# Patient Record
Sex: MALE | Race: WHITE | NOT HISPANIC OR LATINO | URBAN - METROPOLITAN AREA
[De-identification: names, ages, dates, MRNs, and addresses within clinical notes are randomized per-mention and may not be internally consistent; named-entity substitution may affect disease eponyms.]

---

## 2020-02-27 ENCOUNTER — APPOINTMENT (RX ONLY)
Dept: URBAN - METROPOLITAN AREA CLINIC 116 | Facility: CLINIC | Age: 70
Setting detail: DERMATOLOGY
End: 2020-02-27

## 2020-02-27 DIAGNOSIS — L57.0 ACTINIC KERATOSIS: ICD-10-CM

## 2020-02-27 DIAGNOSIS — D49.2 NEOPLASM OF UNSPECIFIED BEHAVIOR OF BONE, SOFT TISSUE, AND SKIN: ICD-10-CM

## 2020-02-27 DIAGNOSIS — D18.0 HEMANGIOMA: ICD-10-CM

## 2020-02-27 DIAGNOSIS — Z71.89 OTHER SPECIFIED COUNSELING: ICD-10-CM

## 2020-02-27 DIAGNOSIS — L82.1 OTHER SEBORRHEIC KERATOSIS: ICD-10-CM

## 2020-02-27 DIAGNOSIS — D22 MELANOCYTIC NEVI: ICD-10-CM

## 2020-02-27 DIAGNOSIS — L91.8 OTHER HYPERTROPHIC DISORDERS OF THE SKIN: ICD-10-CM

## 2020-02-27 DIAGNOSIS — L81.4 OTHER MELANIN HYPERPIGMENTATION: ICD-10-CM

## 2020-02-27 DIAGNOSIS — D485 NEOPLASM OF UNCERTAIN BEHAVIOR OF SKIN: ICD-10-CM

## 2020-02-27 PROBLEM — D48.5 NEOPLASM OF UNCERTAIN BEHAVIOR OF SKIN: Status: ACTIVE | Noted: 2020-02-27

## 2020-02-27 PROBLEM — D18.01 HEMANGIOMA OF SKIN AND SUBCUTANEOUS TISSUE: Status: ACTIVE | Noted: 2020-02-27

## 2020-02-27 PROBLEM — D22.5 MELANOCYTIC NEVI OF TRUNK: Status: ACTIVE | Noted: 2020-02-27

## 2020-02-27 PROCEDURE — 11103 TANGNTL BX SKIN EA SEP/ADDL: CPT

## 2020-02-27 PROCEDURE — 17003 DESTRUCT PREMALG LES 2-14: CPT

## 2020-02-27 PROCEDURE — ? COUNSELING

## 2020-02-27 PROCEDURE — ? BIOPSY BY SHAVE METHOD

## 2020-02-27 PROCEDURE — 99203 OFFICE O/P NEW LOW 30 MIN: CPT | Mod: 25

## 2020-02-27 PROCEDURE — 11102 TANGNTL BX SKIN SINGLE LES: CPT

## 2020-02-27 PROCEDURE — 17000 DESTRUCT PREMALG LESION: CPT | Mod: 59

## 2020-02-27 PROCEDURE — ? LIQUID NITROGEN

## 2020-02-27 ASSESSMENT — LOCATION SIMPLE DESCRIPTION DERM
LOCATION SIMPLE: RIGHT UPPER BACK
LOCATION SIMPLE: RIGHT AXILLARY VAULT
LOCATION SIMPLE: POSTERIOR NECK
LOCATION SIMPLE: CHEST
LOCATION SIMPLE: RIGHT CHEEK
LOCATION SIMPLE: LEFT CHEEK
LOCATION SIMPLE: LEFT FOREHEAD
LOCATION SIMPLE: ABDOMEN
LOCATION SIMPLE: LEFT PRETIBIAL REGION

## 2020-02-27 ASSESSMENT — LOCATION DETAILED DESCRIPTION DERM
LOCATION DETAILED: LEFT DISTAL PRETIBIAL REGION
LOCATION DETAILED: RIGHT SUPERIOR MEDIAL UPPER BACK
LOCATION DETAILED: RIGHT AXILLARY VAULT
LOCATION DETAILED: RIGHT MEDIAL TRAPEZIAL NECK
LOCATION DETAILED: EPIGASTRIC SKIN
LOCATION DETAILED: RIGHT SUPERIOR LATERAL MALAR CHEEK
LOCATION DETAILED: RIGHT INFERIOR LATERAL MALAR CHEEK
LOCATION DETAILED: RIGHT MID-UPPER BACK
LOCATION DETAILED: RIGHT MEDIAL SUPERIOR CHEST
LOCATION DETAILED: LEFT LATERAL FOREHEAD
LOCATION DETAILED: LEFT CENTRAL MALAR CHEEK

## 2020-02-27 ASSESSMENT — LOCATION ZONE DERM
LOCATION ZONE: AXILLAE
LOCATION ZONE: LEG
LOCATION ZONE: FACE
LOCATION ZONE: TRUNK
LOCATION ZONE: NECK

## 2020-02-27 NOTE — PROCEDURE: BIOPSY BY SHAVE METHOD
Body Location Override (Optional - Billing Will Still Be Based On Selected Body Map Location If Applicable): right left zygoma
Detail Level: Detailed
Depth Of Biopsy: dermis
Was A Bandage Applied: Yes
Size Of Lesion In Cm: 1.2
X Size Of Lesion In Cm: 0
Biopsy Type: H and E
Biopsy Method: Personna blade
Anesthesia Type: 1% lidocaine with epinephrine and a 1:10 solution of 8.4% sodium bicarbonate
Anesthesia Volume In Cc (Will Not Render If 0): 0.5
Hemostasis: Aluminum Chloride
Wound Care: Vaseline
Dressing: Band-Aid
Destruction After The Procedure: No
Type Of Destruction Used: Curettage
Curettage Text: The wound bed was treated with curettage after the biopsy was performed.
Cryotherapy Text: The wound bed was treated with cryotherapy after the biopsy was performed.
Electrodesiccation Text: The wound bed was treated with electrodesiccation after the biopsy was performed.
Electrodesiccation And Curettage Text: The wound bed was treated with electrodesiccation and curettage after the biopsy was performed.
Silver Nitrate Text: The wound bed was treated with silver nitrate after the biopsy was performed.
Lab: [facility]
Lab Facility: [facility]
Consent: The provider's intent is to obtain a tissue sample solely for diagnostic purposes. Written consent to obtain tissue sample was obtained and risks were reviewed including but not limited to scarring, infection, bleeding, scabbing, incomplete removal, nerve damage and allergy to anesthesia.
Post-Care Instructions: I reviewed with the patient in detail post-care instructions. Patient is to keep the biopsy site dry overnight, and then apply bacitracin twice daily until healed. Patient may apply hydrogen peroxide soaks to remove any crusting.
Notification Instructions: Patient will be notified of biopsy results. However, patient instructed to call the office if not contacted within 2 weeks.
Billing Type: [employer]
Body Location Override (Optional - Billing Will Still Be Based On Selected Body Map Location If Applicable): right medial chest
Size Of Lesion In Cm: 0.3
Lab: [id number]
Lab Facility: [id number]
Billing Type: Third-Party Bill
Body Location Override (Optional - Billing Will Still Be Based On Selected Body Map Location If Applicable): left pretibia
Size Of Lesion In Cm: 1.5

## 2020-02-27 NOTE — PROCEDURE: LIQUID NITROGEN
Post-Care Instructions: I reviewed with the patient in detail post-care instructions. Patient is to wear sunprotection, and avoid picking at any of the treated lesions. Pt may apply Vaseline to crusted or scabbing areas.
Detail Level: Simple
Render Post-Care Instructions In Note?: yes
Render Note In Bullet Format When Appropriate: No
Number Of Freeze-Thaw Cycles: 2 freeze-thaw cycles
Consent: The patient's consent was obtained including but not limited to risks of crusting, scabbing, blistering, scarring, darker or lighter pigmentary change, recurrence, incomplete removal and infection.
Duration Of Freeze Thaw-Cycle (Seconds): 2
oriented to person, place, time/situation/alert/awake

## 2020-06-24 ENCOUNTER — APPOINTMENT (RX ONLY)
Dept: URBAN - METROPOLITAN AREA CLINIC 86 | Facility: CLINIC | Age: 70
Setting detail: DERMATOLOGY
End: 2020-06-24

## 2020-11-02 ENCOUNTER — APPOINTMENT (RX ONLY)
Dept: URBAN - METROPOLITAN AREA CLINIC 116 | Facility: CLINIC | Age: 70
Setting detail: DERMATOLOGY
End: 2020-11-02

## 2020-12-08 ENCOUNTER — APPOINTMENT (RX ONLY)
Dept: URBAN - METROPOLITAN AREA CLINIC 116 | Facility: CLINIC | Age: 70
Setting detail: DERMATOLOGY
End: 2020-12-08

## 2020-12-08 VITALS — TEMPERATURE: 97.1 F

## 2020-12-08 DIAGNOSIS — D49.2 NEOPLASM OF UNSPECIFIED BEHAVIOR OF BONE, SOFT TISSUE, AND SKIN: ICD-10-CM

## 2020-12-08 DIAGNOSIS — L57.0 ACTINIC KERATOSIS: ICD-10-CM

## 2020-12-08 DIAGNOSIS — L82.1 OTHER SEBORRHEIC KERATOSIS: ICD-10-CM

## 2020-12-08 DIAGNOSIS — D18.0 HEMANGIOMA: ICD-10-CM

## 2020-12-08 DIAGNOSIS — L81.4 OTHER MELANIN HYPERPIGMENTATION: ICD-10-CM

## 2020-12-08 DIAGNOSIS — D22 MELANOCYTIC NEVI: ICD-10-CM

## 2020-12-08 PROBLEM — D18.01 HEMANGIOMA OF SKIN AND SUBCUTANEOUS TISSUE: Status: ACTIVE | Noted: 2020-12-08

## 2020-12-08 PROBLEM — D22.5 MELANOCYTIC NEVI OF TRUNK: Status: ACTIVE | Noted: 2020-12-08

## 2020-12-08 PROBLEM — C44.719 BASAL CELL CARCINOMA OF SKIN OF LEFT LOWER LIMB, INCLUDING HIP: Status: ACTIVE | Noted: 2020-12-08

## 2020-12-08 PROCEDURE — ? LIQUID NITROGEN

## 2020-12-08 PROCEDURE — 17000 DESTRUCT PREMALG LESION: CPT | Mod: 59

## 2020-12-08 PROCEDURE — ? COUNSELING

## 2020-12-08 PROCEDURE — 99214 OFFICE O/P EST MOD 30 MIN: CPT | Mod: 25

## 2020-12-08 PROCEDURE — ? SHAVE REMOVAL

## 2020-12-08 PROCEDURE — ? DIAGNOSIS COMMENT

## 2020-12-08 PROCEDURE — 17003 DESTRUCT PREMALG LES 2-14: CPT

## 2020-12-08 PROCEDURE — ? TREATMENT REGIMEN

## 2020-12-08 PROCEDURE — ? PRESCRIPTION

## 2020-12-08 PROCEDURE — 11301 SHAVE SKIN LESION 0.6-1.0 CM: CPT

## 2020-12-08 RX ORDER — MUPIROCIN 20 MG/G
1 OINTMENT TOPICAL BID
Qty: 1 | Refills: 2 | Status: ERX | COMMUNITY
Start: 2020-12-08

## 2020-12-08 RX ORDER — IMIQUIMOD 50 MG/G
1 CREAM TOPICAL QHS
Qty: 2 | Refills: 3 | Status: ERX | COMMUNITY
Start: 2020-12-08

## 2020-12-08 RX ADMIN — MUPIROCIN 1: 20 OINTMENT TOPICAL at 00:00

## 2020-12-08 RX ADMIN — IMIQUIMOD 1: 50 CREAM TOPICAL at 00:00

## 2020-12-08 ASSESSMENT — LOCATION SIMPLE DESCRIPTION DERM
LOCATION SIMPLE: LEFT EAR
LOCATION SIMPLE: RIGHT UPPER BACK
LOCATION SIMPLE: LEFT UPPER ARM
LOCATION SIMPLE: ABDOMEN
LOCATION SIMPLE: POSTERIOR NECK
LOCATION SIMPLE: LEFT CHEEK

## 2020-12-08 ASSESSMENT — LOCATION ZONE DERM
LOCATION ZONE: ARM
LOCATION ZONE: TRUNK
LOCATION ZONE: FACE
LOCATION ZONE: EAR
LOCATION ZONE: NECK

## 2020-12-08 ASSESSMENT — LOCATION DETAILED DESCRIPTION DERM
LOCATION DETAILED: RIGHT MEDIAL TRAPEZIAL NECK
LOCATION DETAILED: EPIGASTRIC SKIN
LOCATION DETAILED: RIGHT SUPERIOR MEDIAL UPPER BACK
LOCATION DETAILED: LEFT SUPERIOR HELIX
LOCATION DETAILED: LEFT ANTERIOR DISTAL UPPER ARM
LOCATION DETAILED: RIGHT MID-UPPER BACK
LOCATION DETAILED: LEFT CENTRAL MALAR CHEEK

## 2020-12-08 NOTE — PROCEDURE: LIQUID NITROGEN
Post-Care Instructions: I reviewed with the patient in detail post-care instructions. Patient is to wear sunprotection, and avoid picking at any of the treated lesions. Pt may apply Vaseline to crusted or scabbing areas.
Consent: The patient's consent was obtained including but not limited to risks of crusting, scabbing, blistering, scarring, darker or lighter pigmentary change, recurrence, incomplete removal and infection.
Detail Level: Detailed
Duration Of Freeze Thaw-Cycle (Seconds): 2
Number Of Freeze-Thaw Cycles: 2 freeze-thaw cycles
Render Post-Care Instructions In Note?: yes
Render Note In Bullet Format When Appropriate: No

## 2020-12-08 NOTE — PROCEDURE: SHAVE REMOVAL
Medical Necessity Information: It is in your best interest to select a reason for this procedure from the list below. All of these items fulfill various CMS LCD requirements except the new and changing color options.
Medical Necessity Clause: The lesion was removed in its entirety and was medically necessary because the lesion that was treated was:
Lab: [facility]
Lab Facility: [facility]
Body Location Override (Optional - Billing Will Still Be Based On Selected Body Map Location If Applicable): left upper arm
Detail Level: Detailed
Was A Bandage Applied: Yes
Size Of Lesion In Cm (Required): 0.6
X Size Of Lesion In Cm (Optional): 0
Biopsy Method: Dermablade
Anesthesia Type: 1% lidocaine with epinephrine and a 1:10 solution of 8.4% sodium bicarbonate
Anesthesia Volume In Cc: 1
Hemostasis: Electrocautery
Wound Care: Petrolatum
Path Notes (To The Dermatopathologist): Jolie Perez ...
Render Path Notes In Note?: No
Consent: The provider's intent is to therapeutically remove the lesion in its entirety while at the same time obtaining a tissue sample for histopathologic examination. The risks and benefits to therapy were discussed in detail. Specifically, the risks of infection, scarring, bleeding, prolonged wound healing, incomplete removal, allergy to anesthesia, nerve injury and recurrence were addressed. Prior to the procedure, the treatment site was clearly identified and confirmed by the patient. All components of Universal Protocol/PAUSE Rule completed.
Post-Care Instructions: I reviewed with the patient in detail post-care instructions. Patient is to keep the biopsy site dry overnight, and then apply bacitracin twice daily until healed. Patient may apply hydrogen peroxide soaks to remove any crusting.
Notification Instructions: Patient will be notified of biopsy results. However, patient instructed to call the office if not contacted within 2 weeks.
Billing Type: [employer]

## 2021-01-21 ENCOUNTER — APPOINTMENT (RX ONLY)
Dept: URBAN - METROPOLITAN AREA CLINIC 116 | Facility: CLINIC | Age: 71
Setting detail: DERMATOLOGY
End: 2021-01-21

## 2021-01-21 VITALS — TEMPERATURE: 97.2 F

## 2021-01-21 PROBLEM — C44.719 BASAL CELL CARCINOMA OF SKIN OF LEFT LOWER LIMB, INCLUDING HIP: Status: ACTIVE | Noted: 2021-01-21

## 2021-01-21 PROBLEM — C44.619 BASAL CELL CARCINOMA OF SKIN OF LEFT UPPER LIMB, INCLUDING SHOULDER: Status: ACTIVE | Noted: 2021-01-21

## 2021-01-21 PROCEDURE — ? CURETTAGE AND DESTRUCTION

## 2021-01-21 PROCEDURE — 17261 DSTRJ MAL LES T/A/L .6-1.0CM: CPT

## 2021-01-21 PROCEDURE — 17262 DSTRJ MAL LES T/A/L 1.1-2.0: CPT

## 2021-01-21 PROCEDURE — ? COUNSELING

## 2021-01-21 NOTE — PROCEDURE: MIPS QUALITY
Name And Contact Information For Health Care Proxy: Amanda Rodgers ( wife) [phone]
Quality 130: Documentation Of Current Medications In The Medical Record: Current Medications Documented
Quality 111:Pneumonia Vaccination Status For Older Adults: Pneumococcal Vaccination Previously Received
Detail Level: Simple
Quality 226: Preventive Care And Screening: Tobacco Use: Screening And Cessation Intervention: Patient screened for tobacco use and is an ex/non-smoker
Quality 47: Advance Care Plan: Advance Care Planning discussed and documented; advance care plan or surrogate decision maker documented in the medical record.

## 2021-01-21 NOTE — PROCEDURE: CURETTAGE AND DESTRUCTION
Body Location Override (Optional - Billing Will Still Be Based On Selected Body Map Location If Applicable): left upper arm
Detail Level: Detailed
Biopsy Photograph Reviewed: Yes
Number Of Curettages: 3
Size Of Lesion In Cm: 0.6
Size Of Lesion After Curettage: 1
Add Intralesional Injection: No
Anesthesia Type: 1% lidocaine with 1:100,000 epinephrine and a 1:12 solution of 8.4% sodium bicarbonate
Anesthesia Volume In Cc: 2
Cautery Type: electrodesiccation
What Was Performed First?: Destruction
Additional Information: (Optional): The wound was cleaned, and a pressure dressing was applied. The patient received detailed post-op instructions.
Consent was obtained from the patient. The risks, benefits and alternatives to therapy were discussed in detail. Specifically, the risks of infection, scarring, bleeding, prolonged wound healing, nerve injury, incomplete removal, allergy to anesthesia and recurrence were addressed. Alternatives to [facility], such as: surgical removal and XRT were also discussed. Prior to the procedure, the treatment site was clearly identified and confirmed by the patient. All components of Universal Protocol/PAUSE Rule completed.
Post-Care Instructions: I reviewed with the patient in detail post-care instructions. Patient is to keep the area dry for 48 hours, and not to engage in any swimming until the area is healed. Should the patient develop any fevers, chills, bleeding, severe pain patient will contact the office immediately.
Bill As A Line Item Or As Units: Line Item
Body Location Override (Optional - Billing Will Still Be Based On Selected Body Map Location If Applicable): left pretibia
Size Of Lesion In Cm: 1.5
Size Of Lesion After Curettage: 1.9
Additional Information: (Optional): The wound was cleaned, and a pressure dressing was applied.  The patient received detailed post-op instructions.
Consent was obtained from the patient. The risks, benefits and alternatives to therapy were discussed in detail. Specifically, the risks of infection, scarring, bleeding, prolonged wound healing, nerve injury, incomplete removal, allergy to anesthesia and recurrence were addressed. Alternatives to ED&C, such as: surgical removal and XRT were also discussed.  Prior to the procedure, the treatment site was clearly identified and confirmed by the patient. All components of Universal Protocol/PAUSE Rule completed.

## 2023-10-11 DIAGNOSIS — I48.0 PAROXYSMAL ATRIAL FIBRILLATION (MULTI): ICD-10-CM

## 2023-10-11 DIAGNOSIS — I50.9 OTHER CONGESTIVE HEART FAILURE (MULTI): ICD-10-CM

## 2023-10-11 PROBLEM — N40.0 BPH (BENIGN PROSTATIC HYPERPLASIA): Status: ACTIVE | Noted: 2023-10-11

## 2023-10-11 PROBLEM — G47.33 OBSTRUCTIVE SLEEP APNEA, ADULT: Status: ACTIVE | Noted: 2023-10-11

## 2023-10-11 PROBLEM — E11.40 DIABETIC NEUROPATHY (MULTI): Status: ACTIVE | Noted: 2023-10-11

## 2023-10-11 PROBLEM — R94.31 ABNORMAL EKG: Status: ACTIVE | Noted: 2023-10-11

## 2023-10-11 PROBLEM — E11.9 DIABETES (MULTI): Status: ACTIVE | Noted: 2023-10-11

## 2023-10-11 PROBLEM — N18.32 STAGE 3B CHRONIC KIDNEY DISEASE (MULTI): Status: ACTIVE | Noted: 2023-10-11

## 2023-10-11 PROBLEM — I44.0 FIRST DEGREE AV BLOCK: Status: ACTIVE | Noted: 2023-10-11

## 2023-10-11 PROBLEM — I10 HYPERTENSION: Status: ACTIVE | Noted: 2023-10-11

## 2023-10-11 PROBLEM — Z89.511: Status: ACTIVE | Noted: 2023-10-11

## 2023-10-11 PROBLEM — E78.2 MIXED HYPERLIPIDEMIA: Status: ACTIVE | Noted: 2023-10-11

## 2023-10-11 RX ORDER — MIRABEGRON 50 MG/1
1 TABLET, EXTENDED RELEASE ORAL DAILY
COMMUNITY
Start: 2022-11-28

## 2023-10-11 RX ORDER — ADHESIVE BANDAGE
BANDAGE TOPICAL DAILY PRN
COMMUNITY

## 2023-10-11 RX ORDER — PANTOPRAZOLE SODIUM 40 MG/1
1 TABLET, DELAYED RELEASE ORAL DAILY
COMMUNITY
Start: 2022-10-13

## 2023-10-11 RX ORDER — GABAPENTIN 300 MG/1
4 CAPSULE ORAL 2 TIMES DAILY
COMMUNITY
Start: 2022-07-27

## 2023-10-11 RX ORDER — OXYBUTYNIN CHLORIDE 10 MG/1
1 TABLET, EXTENDED RELEASE ORAL DAILY
COMMUNITY
Start: 2022-11-28 | End: 2024-03-25 | Stop reason: ALTCHOICE

## 2023-10-11 RX ORDER — ASPIRIN 81 MG/1
1 TABLET ORAL DAILY
COMMUNITY

## 2023-10-11 RX ORDER — CALCIUM CARB, CITRATE, MALATE 250 MG
1 CAPSULE ORAL DAILY
COMMUNITY

## 2023-10-11 RX ORDER — AMIODARONE HYDROCHLORIDE 200 MG/1
1 TABLET ORAL EVERY OTHER DAY
COMMUNITY
Start: 2022-05-18 | End: 2024-01-31

## 2023-10-11 RX ORDER — INSULIN LISPRO 100 [IU]/ML
INJECTION, SOLUTION INTRAVENOUS; SUBCUTANEOUS
COMMUNITY
Start: 2022-07-26

## 2023-10-11 RX ORDER — INSULIN GLARGINE 100 [IU]/ML
INJECTION, SOLUTION SUBCUTANEOUS
COMMUNITY

## 2023-10-11 RX ORDER — DAPAGLIFLOZIN 5 MG/1
5 TABLET, FILM COATED ORAL
Qty: 90 TABLET | Refills: 1 | OUTPATIENT
Start: 2023-10-11

## 2023-10-11 RX ORDER — FENOFIBRATE 160 MG/1
1 TABLET ORAL DAILY
COMMUNITY
Start: 2022-10-13

## 2023-10-11 RX ORDER — TAMSULOSIN HYDROCHLORIDE 0.4 MG/1
0.4 CAPSULE ORAL DAILY
COMMUNITY
End: 2024-03-25 | Stop reason: ALTCHOICE

## 2023-10-11 RX ORDER — FINASTERIDE 5 MG/1
1 TABLET, FILM COATED ORAL DAILY
COMMUNITY
Start: 2022-10-13

## 2023-10-11 RX ORDER — OXYCODONE AND ACETAMINOPHEN 5; 325 MG/1; MG/1
1 TABLET ORAL EVERY 6 HOURS PRN
COMMUNITY
Start: 2021-12-20

## 2023-10-11 RX ORDER — BUMETANIDE 1 MG/1
1 TABLET ORAL 2 TIMES DAILY
COMMUNITY
Start: 2022-05-18

## 2023-10-11 RX ORDER — LEVOTHYROXINE SODIUM 50 UG/1
1 TABLET ORAL
COMMUNITY
Start: 2022-05-23

## 2023-10-11 RX ORDER — ROSUVASTATIN CALCIUM 10 MG/1
1 TABLET, COATED ORAL DAILY
COMMUNITY
Start: 2022-10-13

## 2023-10-11 RX ORDER — NIFEDIPINE 90 MG/1
1 TABLET, EXTENDED RELEASE ORAL DAILY
COMMUNITY
Start: 2021-10-11

## 2023-10-11 RX ORDER — DAPAGLIFLOZIN 5 MG/1
1 TABLET, FILM COATED ORAL
COMMUNITY
End: 2023-10-16 | Stop reason: SDUPTHER

## 2023-10-11 RX ORDER — ALLOPURINOL 300 MG/1
1 TABLET ORAL DAILY
COMMUNITY
Start: 2022-06-13 | End: 2024-03-25 | Stop reason: ALTCHOICE

## 2023-10-11 RX ORDER — FOLIC ACID 1 MG/1
1 TABLET ORAL EVERY OTHER DAY
COMMUNITY

## 2023-10-16 RX ORDER — DAPAGLIFLOZIN 5 MG/1
5 TABLET, FILM COATED ORAL
Qty: 90 TABLET | Refills: 1 | Status: SHIPPED | OUTPATIENT
Start: 2023-10-16 | End: 2024-03-04

## 2023-10-16 NOTE — TELEPHONE ENCOUNTER
Patient wife called inquiring why rx was denied,  upon review [facility] denied RX. Patient does have a POV 3/25/2024. AT time of office visit patient was not aware of need of refill. Rx pended to  for authorization to [facility].

## 2023-12-13 ENCOUNTER — TELEPHONE (OUTPATIENT)
Dept: CARDIOLOGY | Facility: CLINIC | Age: 73
End: 2023-12-13

## 2023-12-13 DIAGNOSIS — I48.0 PAROXYSMAL ATRIAL FIBRILLATION (MULTI): ICD-10-CM

## 2023-12-13 DIAGNOSIS — Z79.899 HIGH RISK MEDICATION USE: ICD-10-CM

## 2023-12-13 NOTE — TELEPHONE ENCOUNTER
Amiodarone testing orders sent to [facility] due in now - baselines.  PFT paper order also completed.

## 2024-01-24 PROBLEM — Z79.899 HIGH RISK MEDICATION USE: Status: ACTIVE | Noted: 2024-01-24

## 2024-01-24 PROBLEM — E78.5 HLD (HYPERLIPIDEMIA): Status: ACTIVE | Noted: 2024-01-24

## 2024-01-24 PROBLEM — N40.0 BENIGN PROSTATIC HYPERPLASIA: Chronic | Status: ACTIVE | Noted: 2021-09-01

## 2024-01-24 PROBLEM — E87.6 HYPOKALEMIA: Status: ACTIVE | Noted: 2022-04-15

## 2024-01-24 PROBLEM — G47.33 OBSTRUCTIVE SLEEP APNEA SYNDROME: Status: ACTIVE | Noted: 2022-04-28

## 2024-01-31 DIAGNOSIS — I48.0 PAROXYSMAL ATRIAL FIBRILLATION (MULTI): ICD-10-CM

## 2024-01-31 RX ORDER — AMIODARONE HYDROCHLORIDE 200 MG/1
200 TABLET ORAL EVERY OTHER DAY
Qty: 45 TABLET | Refills: 0 | Status: SHIPPED | OUTPATIENT
Start: 2024-01-31 | End: 2024-03-25 | Stop reason: DRUGHIGH

## 2024-02-02 ENCOUNTER — TELEPHONE (OUTPATIENT)
Dept: CARDIOLOGY | Facility: CLINIC | Age: 74
End: 2024-02-02
Payer: MEDICARE

## 2024-02-02 NOTE — TELEPHONE ENCOUNTER
Patient was due for amiodarone testing in [facility] at [facility]. PFT noted but not labs or chest. Attempted to phone patient. No answer.  Left detailed message on machine that testing is still needed and was sent to [facility] already.

## 2024-03-25 ENCOUNTER — OFFICE VISIT (OUTPATIENT)
Dept: CARDIOLOGY | Facility: CLINIC | Age: 74
End: 2024-03-25
Payer: MEDICARE

## 2024-03-25 VITALS
WEIGHT: 262 LBS | DIASTOLIC BLOOD PRESSURE: 84 MMHG | HEIGHT: 75 IN | BODY MASS INDEX: 32.58 KG/M2 | SYSTOLIC BLOOD PRESSURE: 138 MMHG | HEART RATE: 87 BPM

## 2024-03-25 DIAGNOSIS — I44.0 FIRST DEGREE AV BLOCK: ICD-10-CM

## 2024-03-25 DIAGNOSIS — E87.6 HYPOKALEMIA: ICD-10-CM

## 2024-03-25 DIAGNOSIS — R94.31 ABNORMAL EKG: ICD-10-CM

## 2024-03-25 DIAGNOSIS — Z79.01 LONG TERM CURRENT USE OF ANTICOAGULANT THERAPY: ICD-10-CM

## 2024-03-25 DIAGNOSIS — E78.2 MIXED HYPERLIPIDEMIA: ICD-10-CM

## 2024-03-25 DIAGNOSIS — Z78.9 NEVER SMOKED CIGARETTES: ICD-10-CM

## 2024-03-25 DIAGNOSIS — E11.69 TYPE 2 DIABETES MELLITUS WITH OTHER SPECIFIED COMPLICATION, WITH LONG-TERM CURRENT USE OF INSULIN (MULTI): ICD-10-CM

## 2024-03-25 DIAGNOSIS — Z79.4 TYPE 2 DIABETES MELLITUS WITH OTHER SPECIFIED COMPLICATION, WITH LONG-TERM CURRENT USE OF INSULIN (MULTI): ICD-10-CM

## 2024-03-25 DIAGNOSIS — I48.0 PAROXYSMAL ATRIAL FIBRILLATION (MULTI): Primary | ICD-10-CM

## 2024-03-25 DIAGNOSIS — N18.32 STAGE 3B CHRONIC KIDNEY DISEASE (MULTI): ICD-10-CM

## 2024-03-25 PROCEDURE — 99214 OFFICE O/P EST MOD 30 MIN: CPT | Performed by: INTERNAL MEDICINE

## 2024-03-25 PROCEDURE — 1036F TOBACCO NON-USER: CPT | Performed by: INTERNAL MEDICINE

## 2024-03-25 PROCEDURE — 93000 ELECTROCARDIOGRAM COMPLETE: CPT | Performed by: INTERNAL MEDICINE

## 2024-03-25 PROCEDURE — 1160F RVW MEDS BY RX/DR IN RCRD: CPT | Performed by: INTERNAL MEDICINE

## 2024-03-25 PROCEDURE — 1159F MED LIST DOCD IN RCRD: CPT | Performed by: INTERNAL MEDICINE

## 2024-03-25 PROCEDURE — 3075F SYST BP GE 130 - 139MM HG: CPT | Performed by: INTERNAL MEDICINE

## 2024-03-25 PROCEDURE — 3079F DIAST BP 80-89 MM HG: CPT | Performed by: INTERNAL MEDICINE

## 2024-03-25 RX ORDER — ALLOPURINOL 100 MG/1
100 TABLET ORAL DAILY
COMMUNITY

## 2024-03-25 RX ORDER — AMIODARONE HYDROCHLORIDE 200 MG/1
200 TABLET ORAL DAILY
Qty: 90 TABLET | Refills: 1 | Status: SHIPPED | OUTPATIENT
Start: 2024-03-25 | End: 2025-03-25

## 2024-03-25 RX ORDER — CHOLECALCIFEROL (VITAMIN D3) 25 MCG
2500 TABLET,CHEWABLE ORAL DAILY
COMMUNITY

## 2024-03-25 NOTE — PATIENT INSTRUCTIONS
Please bring all medicines, vitamins, and herbal supplements with you when you come to the office.    Prescriptions will not be filled unless you are compliant with your follow up appointments or have a follow up appointment scheduled as per instruction of your physician. Refills should be requested at the time of your visit.     BMI was above normal measurement. Current weight: 119 kg (262 lb)  Weight change since last visit (-) denotes wt loss 1 lbs   Weight loss needed to achieve BMI 25: 62.4 Lbs  Weight loss needed to achieve BMI 30: 22.5 Lbs  Provided instructions on dietary changes  Provided instructions on exercise.    Amiodarone follow up per routine.

## 2024-03-25 NOTE — PROGRESS NOTES
73 year-old with multiple cardiac risk factors including hypertension, insulin requiring diabetes, dyslipidemia, peripheral vascular disease status post right above-knee amputation, presents for 6-month follow-up.  Patient is accompanied by wife to the office    Subjective :   Interval review of systems is negative for chest discomfort pressure tightness heaviness palpitations lightheadedness orthopnea paroxysmal nocturnal dyspnea dependent edema or claudication TIA or CVA type symptoms or bleeding diathesis  Has indwelling Baker catheter which she has had for the past few years gets changed once a month  No bleeding diathesis  No clinical symptoms of amiodarone toxicity  History so Far :  1. Hypertension-at target  2. Symptoms of congestive heart failure December 2021-details not available  3. Atrial fibrillation first diagnosed December 2021-details not available patient apparently was weak short of breath had lower extremity edema and confusion  4. Prolonged first-degree AV block and poor R wave progression with left atrial abnormality on EKG  5. High risk medications-amiodarone and Eliquis  6. Insulin requiring diabetes without symptoms of hypoglycemia  7. Dyslipidemia-on low-dose of high intensity statin along with fenofibrate  8. Hypothyroidism-on levothyroxine supplementation  9. Increased BMI  10. Obstructive sleep apnea on IPAP.  11. Chronic hypoxic respiratory failure  12. Status post right below knee amputation  13. Peripheral vascular disease and nonhealing ulcer left foot  14. Recently initiated on aspirin by vascular surgery, and I concur  15.Prior left lower extremity intervention in April 2022 at outside facility-details not available  16. Moderate fall risk  17. Echocardiogram 2/7/2023-LVEF 65% severely increased LV septal thickness, this was measured at 1.9 cm LV posterior wall was measured at 1.4 cm LV end-systolic dimension 2.8 cm impaired relaxation pattern of LV diastolic filling left atrial  "diameter 4.5 cm trace to mild mitral regurgitation trace tricuspid regurgitation RVSP not estimated. There was no mention of any LV outflow tract obstruction, based on the gradients across the aortic valve, LVOT at rest is unlikely.  18.Echocardiogram July 2023 LVEF 60% impaired relaxation pattern of LV diastolic filling moderate asymmetric left ventricular hypertrophy septum 21 mm posterior wall 16 mm no evidence of LVOT gradient or systolic anterior motion of the anterior mitral leaflet normal left atrial right atrial and right ventricular size peak and mean gradients across aortic valve 6 and 3 respectively trace mitral regurgitation no tricuspid regurgitation no pericardial effusion, left atrial diameter 4.6 cm and left ventricular end-systolic diameter 3.5 cm  19. Lexiscan Myoview July 2023-normal LVEF 52% transient ischemic dilatation 0.95  20. Ankle-brachial index July 2023-normal DMITRY greater than 1.1 bilaterally  21.  Status post right below-knee amputation      22.  PFTs December 2023-DLCO 18.28 which is 66% predicted but when corrected for alveolar ventilation DLCO is 100% predicted chest x-ray without infiltrates or effusions  Objective   Failed to redirect to the Timeline version of the REVFS SmartLink.   Wt Readings from Last 3 Encounters:   03/25/24 119 kg (262 lb)   09/11/23 118 kg (261 lb)   02/09/23 121 kg (267 lb)        Visit Vitals  /84 (BP Location: Right leg, Patient Position: Sitting)   Pulse 87   Ht 1.905 m (6' 3\")   Wt 119 kg (262 lb)   BMI 32.75 kg/m²   Smoking Status Never   BSA 2.51 m²            Physical Exam:    GENERAL APPEARANCE: in no acute distress.  CHEST: Symmetric and non-tender.  INTEGUMENT: Skin warm and dry  HEENT: No gross abnormalities identified.No pallor or scleral icterus.  NECK: Supple, no JVD, no bruit.   NEURO/PSHCY: Alert and oriented x3; appropriate behavior and responses and responses  LUNGS: Clear to auscultation bilaterally; normal respiratory " effort.  HEART: Rate and rhythm regular with no evident murmur; no gallop appreciated.   ABDOMEN: Soft, non tender.  MUSCULOSKELETAL: No gross deformities.  EXTREMITIES: Status post right below-knee amputation  Meds:  Current Outpatient Medications   Medication Instructions    allopurinol (ZYLOPRIM) 100 mg, oral, Daily    amiodarone (PACERONE) 200 mg, oral, Every other day    aspirin 81 mg EC tablet 1 tablet, oral, Daily    bumetanide (Bumex) 1 mg tablet 1 tablet, oral, 2 times daily    cyanocobalamin (VITAMIN B-12) 2,500 mcg, oral, Daily    docusate sodium (COLACE ORAL) 100 mg, oral    Eliquis 5 mg, oral, 2 times daily    Farxiga 5 mg, oral, Daily before breakfast    fenofibrate (Triglide) 160 mg tablet 1 tablet, oral, Daily    finasteride (Proscar) 5 mg tablet 1 tablet, oral, Daily    folic acid (Folvite) 1 mg tablet 1 tablet, oral, Every other day    gabapentin (Neurontin) 300 mg capsule 4 capsules, oral, 2 times daily    insulin glargine (Lantus Solostar U-100 Insulin) 100 unit/mL (3 mL) pen subcutaneous    insulin lispro (HumaLOG KwikPen Insulin) 100 unit/mL injection subcutaneous    levothyroxine (Synthroid, Levoxyl) 50 mcg tablet 1 tablet, oral, Daily before breakfast    magnesium hydroxide (Milk of Magnesia) 400 mg/5 mL suspension oral, Daily PRN    mirabegron (Myrbetriq) 50 mg tablet extended release 24 hr 24 hr tablet 1 tablet, oral, Daily    NIFEdipine ER (NIFEdipine CC) 90 mg 24 hr tablet 1 tablet, oral, Daily    oxyCODONE-acetaminophen (Percocet) 5-325 mg tablet 1 tablet, oral, Every 6 hours PRN    pantoprazole (ProtoNix) 40 mg EC tablet 1 tablet, oral, Daily    potassium citrate 99 mg capsule 1 tablet, oral, Daily    rosuvastatin (Crestor) 10 mg tablet 1 tablet, oral, Daily          Allergies   Allergen Reactions    Sitagliptin GI intolerance and Nausea And Vomiting     Other reaction(s): GI Upset             LABS:    Lab Results   Component Value Date    HGBA1C 9.2 (H) 06/01/2021                  Reviewed laboratory data from February 2024 to include CBC basic metabolic profile liver enzymes pulmonary function testing and chest x-ray results.  Patient has CKD stage IIIb.  CBC is within normal limits potassium is low at 3.3.  GFR is consistent with stage III kidney disease.      Patient Active Problem List    Diagnosis Date Noted    Long term current use of anticoagulant therapy 03/25/2024    Never smoked cigarettes 03/25/2024    HLD (hyperlipidemia) 01/24/2024    High risk medication use 01/24/2024    Abnormal EKG 10/11/2023    Acquired absence of leg below knee, right (CMS/HCC) 10/11/2023    BPH (benign prostatic hyperplasia) 10/11/2023    CHF (congestive heart failure) (CMS/[facility]) 10/11/2023    Diabetes mellitus (CMS/[facility]) 10/11/2023    Diabetic neuropathy (CMS/[facility]) 10/11/2023    First degree AV block 10/11/2023    Hypertension 10/11/2023    Mixed hyperlipidemia 10/11/2023    Obstructive sleep apnea, adult 10/11/2023    Paroxysmal atrial fibrillation (CMS/HCC) 10/11/2023    Stage 3b chronic kidney disease (CMS/HCC) 10/11/2023    Obstructive sleep apnea syndrome 04/28/2022    Hypokalemia 04/15/2022    Benign prostatic hyperplasia 09/01/2021                 Assessment:    1. First degree AV block        2. Abnormal EKG        3. Long term current use of anticoagulant therapy        4. Paroxysmal atrial fibrillation (CMS/HCC)        5. Hypokalemia        6. Stage 3b chronic kidney disease (CMS/[facility])        7. Type 2 diabetes mellitus with other specified complication, with long-term current use of insulin (CMS/[facility])        8. Mixed hyperlipidemia        9. Never smoked cigarettes        Clinical decision making:  Multiple comorbidities as listed above  No clinical evidence of amiodarone toxicity  No lab evidence of amiodarone toxicity prolonged first-degree AV block but improved compared to prior EKG no falls or bleeding diathesis  Hypokalemia-potassium 3.3 February 2024 creatinine 1.95 glucose 180  EKG abnormal  but unchanged from prior studies except that [place] interval has decreased.  Still has first-degree AV block  Will recheck basic metabolic profile, supplement potassium  Follow-up in 6 months with basic metabolic profile and CBC prior to next visit  Provider Attestation - Scribe docum     All medical record entries made by the Scribe were at my direction and personally dictated by me. I have reviewed the chart and agree that the record accurately reflects my personal performance of the history, physical exam, discussion and plan.

## 2024-03-25 NOTE — LETTER
March 25, 2024     Samuel Davis DO  [address] [facility] [address]    Patient: Kamaljit Mccormick   YOB: 1950   Date of Visit: 3/25/2024       Dear Dr. Samuel Davis, DO:    Thank you for referring Kamaljit Mccormick to me for evaluation. Below are my notes for this consultation.  If you have questions, please do not hesitate to call me. I look forward to following your patient along with you.       Sincerely,     Odilia Gallardo MD      CC: No Recipients  ______________________________________________________________________________________    73 year-old with multiple cardiac risk factors including hypertension, insulin requiring diabetes, dyslipidemia, peripheral vascular disease status post right above-knee amputation, presents for 6-month follow-up.  Patient is accompanied by wife to the office    Subjective :   Interval review of systems is negative for chest discomfort pressure tightness heaviness palpitations lightheadedness orthopnea paroxysmal nocturnal dyspnea dependent edema or claudication TIA or CVA type symptoms or bleeding diathesis  Has indwelling Baker catheter which she has had for the past few years gets changed once a month  No bleeding diathesis  No clinical symptoms of amiodarone toxicity  History so Far :  1. Hypertension-at target  2. Symptoms of congestive heart failure December 2021-details not available  3. Atrial fibrillation first diagnosed December 2021-details not available patient apparently was weak short of breath had lower extremity edema and confusion  4. Prolonged first-degree AV block and poor R wave progression with left atrial abnormality on EKG  5. High risk medications-amiodarone and Eliquis  6. Insulin requiring diabetes without symptoms of hypoglycemia  7. Dyslipidemia-on low-dose of high intensity statin along with fenofibrate  8. Hypothyroidism-on levothyroxine supplementation  9. Increased BMI  10. Obstructive sleep apnea on IPAP.  11. Chronic  hypoxic respiratory failure  12. Status post right below knee amputation  13. Peripheral vascular disease and nonhealing ulcer left foot  14. Recently initiated on aspirin by vascular surgery, and I concur  15.Prior left lower extremity intervention in April 2022 at outside facility-details not available  16. Moderate fall risk  17. Echocardiogram 2/7/2023-LVEF 65% severely increased LV septal thickness, this was measured at 1.9 cm LV posterior wall was measured at 1.4 cm LV end-systolic dimension 2.8 cm impaired relaxation pattern of LV diastolic filling left atrial diameter 4.5 cm trace to mild mitral regurgitation trace tricuspid regurgitation RVSP not estimated. There was no mention of any LV outflow tract obstruction, based on the gradients across the aortic valve, LVOT at rest is unlikely.  18.Echocardiogram July 2023 LVEF 60% impaired relaxation pattern of LV diastolic filling moderate asymmetric left ventricular hypertrophy septum 21 mm posterior wall 16 mm no evidence of LVOT gradient or systolic anterior motion of the anterior mitral leaflet normal left atrial right atrial and right ventricular size peak and mean gradients across aortic valve 6 and 3 respectively trace mitral regurgitation no tricuspid regurgitation no pericardial effusion, left atrial diameter 4.6 cm and left ventricular end-systolic diameter 3.5 cm  19. Lexiscan Myoview July 2023-normal LVEF 52% transient ischemic dilatation 0.95  20. Ankle-brachial index July 2023-normal DMITRY greater than 1.1 bilaterally  21.  Status post right below-knee amputation      22.  PFTs December 2023-DLCO 18.28 which is 66% predicted but when corrected for alveolar ventilation DLCO is 100% predicted chest x-ray without infiltrates or effusions  Objective   Failed to redirect to the Timeline version of the REVFS SmartLink.   Wt Readings from Last 3 Encounters:   03/25/24 119 kg (262 lb)   09/11/23 118 kg (261 lb)   02/09/23 121 kg (267 lb)        Visit  "Vitals  /84 (BP Location: Right leg, Patient Position: Sitting)   Pulse 87   Ht 1.905 m (6' 3\")   Wt 119 kg (262 lb)   BMI 32.75 kg/m²   Smoking Status Never   BSA 2.51 m²            Physical Exam:    GENERAL APPEARANCE: in no acute distress.  CHEST: Symmetric and non-tender.  INTEGUMENT: Skin warm and dry  HEENT: No gross abnormalities identified.No pallor or scleral icterus.  NECK: Supple, no JVD, no bruit.   NEURO/PSHCY: Alert and oriented x3; appropriate behavior and responses and responses  LUNGS: Clear to auscultation bilaterally; normal respiratory effort.  HEART: Rate and rhythm regular with no evident murmur; no gallop appreciated.   ABDOMEN: Soft, non tender.  MUSCULOSKELETAL: No gross deformities.  EXTREMITIES: Status post right below-knee amputation  Meds:  Current Outpatient Medications   Medication Instructions   • allopurinol (ZYLOPRIM) 100 mg, oral, Daily   • amiodarone (PACERONE) 200 mg, oral, Every other day   • aspirin 81 mg EC tablet 1 tablet, oral, Daily   • bumetanide (Bumex) 1 mg tablet 1 tablet, oral, 2 times daily   • cyanocobalamin (VITAMIN B-12) 2,500 mcg, oral, Daily   • docusate sodium (COLACE ORAL) 100 mg, oral   • Eliquis 5 mg, oral, 2 times daily   • Farxiga 5 mg, oral, Daily before breakfast   • fenofibrate (Triglide) 160 mg tablet 1 tablet, oral, Daily   • finasteride (Proscar) 5 mg tablet 1 tablet, oral, Daily   • folic acid (Folvite) 1 mg tablet 1 tablet, oral, Every other day   • gabapentin (Neurontin) 300 mg capsule 4 capsules, oral, 2 times daily   • insulin glargine (Lantus Solostar U-100 Insulin) 100 unit/mL (3 mL) pen subcutaneous   • insulin lispro (HumaLOG KwikPen Insulin) 100 unit/mL injection subcutaneous   • levothyroxine (Synthroid, Levoxyl) 50 mcg tablet 1 tablet, oral, Daily before breakfast   • magnesium hydroxide (Milk of Magnesia) 400 mg/5 mL suspension oral, Daily PRN   • mirabegron (Myrbetriq) 50 mg tablet extended release 24 hr 24 hr tablet 1 tablet, " oral, Daily   • NIFEdipine ER (NIFEdipine CC) 90 mg 24 hr tablet 1 tablet, oral, Daily   • oxyCODONE-acetaminophen (Percocet) 5-325 mg tablet 1 tablet, oral, Every 6 hours PRN   • pantoprazole (ProtoNix) 40 mg EC tablet 1 tablet, oral, Daily   • potassium citrate 99 mg capsule 1 tablet, oral, Daily   • rosuvastatin (Crestor) 10 mg tablet 1 tablet, oral, Daily          Allergies   Allergen Reactions   • Sitagliptin GI intolerance and Nausea And Vomiting     Other reaction(s): GI Upset             LABS:    Lab Results   Component Value Date    HGBA1C 9.2 (H) 06/01/2021                 Reviewed laboratory data from February 2024 to include CBC basic metabolic profile liver enzymes pulmonary function testing and chest x-ray results.  Patient has CKD stage IIIb.  CBC is within normal limits potassium is low at 3.3.  GFR is consistent with stage III kidney disease.      Patient Active Problem List    Diagnosis Date Noted   • Long term current use of anticoagulant therapy 03/25/2024   • Never smoked cigarettes 03/25/2024   • HLD (hyperlipidemia) 01/24/2024   • High risk medication use 01/24/2024   • Abnormal EKG 10/11/2023   • Acquired absence of leg below knee, right (CMS/HCC) 10/11/2023   • BPH (benign prostatic hyperplasia) 10/11/2023   • CHF (congestive heart failure) (CMS/[facility]) 10/11/2023   • Diabetes mellitus (CMS/HCC) 10/11/2023   • Diabetic neuropathy (CMS/[facility]) 10/11/2023   • First degree AV block 10/11/2023   • Hypertension 10/11/2023   • Mixed hyperlipidemia 10/11/2023   • Obstructive sleep apnea, adult 10/11/2023   • Paroxysmal atrial fibrillation (CMS/HCC) 10/11/2023   • Stage 3b chronic kidney disease (CMS/HCC) 10/11/2023   • Obstructive sleep apnea syndrome 04/28/2022   • Hypokalemia 04/15/2022   • Benign prostatic hyperplasia 09/01/2021                 Assessment:    1. First degree AV block        2. Abnormal EKG        3. Long term current use of anticoagulant therapy        4. Paroxysmal atrial  fibrillation (CMS/HCC)        5. Hypokalemia        6. Stage 3b chronic kidney disease (CMS/HCC)        7. Type 2 diabetes mellitus with other specified complication, with long-term current use of insulin (CMS/[facility])        8. Mixed hyperlipidemia        9. Never smoked cigarettes        Clinical decision making:  Multiple comorbidities as listed above  No clinical evidence of amiodarone toxicity  No lab evidence of amiodarone toxicity prolonged first-degree AV block but improved compared to prior EKG no falls or bleeding diathesis  Hypokalemia-potassium 3.3 February 2024 creatinine 1.95 glucose 180  EKG abnormal but unchanged from prior studies except that IL interval has decreased.  Still has first-degree AV block  Will recheck basic metabolic profile, supplement potassium  Follow-up in 6 months with basic metabolic profile and CBC prior to next visit  Provider Attestation - Scribe docum     All medical record entries made by the Scribe were at my direction and personally dictated by me. I have reviewed the chart and agree that the record accurately reflects my personal performance of the history, physical exam, discussion and plan.

## 2024-09-16 ENCOUNTER — APPOINTMENT (OUTPATIENT)
Dept: CARDIOLOGY | Facility: CLINIC | Age: 74
End: 2024-09-16
Payer: MEDICARE